# Patient Record
Sex: FEMALE | Race: WHITE | NOT HISPANIC OR LATINO | Employment: OTHER | URBAN - METROPOLITAN AREA
[De-identification: names, ages, dates, MRNs, and addresses within clinical notes are randomized per-mention and may not be internally consistent; named-entity substitution may affect disease eponyms.]

---

## 2024-01-15 ENCOUNTER — TRANSFERRED RECORDS (OUTPATIENT)
Dept: MULTI SPECIALTY CLINIC | Facility: CLINIC | Age: 69
End: 2024-01-15

## 2024-10-01 ENCOUNTER — OFFICE VISIT (OUTPATIENT)
Dept: FAMILY MEDICINE | Facility: CLINIC | Age: 69
End: 2024-10-01
Payer: MEDICARE

## 2024-10-01 VITALS
TEMPERATURE: 99 F | HEART RATE: 67 BPM | WEIGHT: 186.2 LBS | DIASTOLIC BLOOD PRESSURE: 88 MMHG | BODY MASS INDEX: 29.22 KG/M2 | RESPIRATION RATE: 18 BRPM | SYSTOLIC BLOOD PRESSURE: 132 MMHG | OXYGEN SATURATION: 96 % | HEIGHT: 67 IN

## 2024-10-01 DIAGNOSIS — H69.93 DYSFUNCTION OF BOTH EUSTACHIAN TUBES: ICD-10-CM

## 2024-10-01 DIAGNOSIS — Z23 NEED FOR TDAP VACCINATION: ICD-10-CM

## 2024-10-01 DIAGNOSIS — Z11.59 NEED FOR HEPATITIS C SCREENING TEST: ICD-10-CM

## 2024-10-01 DIAGNOSIS — F43.21 SITUATIONAL DEPRESSION: ICD-10-CM

## 2024-10-01 DIAGNOSIS — Z13.29 SCREENING FOR ENDOCRINE, METABOLIC AND IMMUNITY DISORDER: ICD-10-CM

## 2024-10-01 DIAGNOSIS — Z13.228 SCREENING FOR ENDOCRINE, METABOLIC AND IMMUNITY DISORDER: ICD-10-CM

## 2024-10-01 DIAGNOSIS — Z13.1 SCREENING FOR DIABETES MELLITUS: ICD-10-CM

## 2024-10-01 DIAGNOSIS — I10 PRIMARY HYPERTENSION: Primary | ICD-10-CM

## 2024-10-01 DIAGNOSIS — Z13.0 SCREENING FOR ENDOCRINE, METABOLIC AND IMMUNITY DISORDER: ICD-10-CM

## 2024-10-01 PROCEDURE — 99204 OFFICE O/P NEW MOD 45 MIN: CPT | Performed by: FAMILY MEDICINE

## 2024-10-01 RX ORDER — AMLODIPINE BESYLATE 5 MG/1
5 TABLET ORAL DAILY
Qty: 90 TABLET | Refills: 3 | Status: SHIPPED | OUTPATIENT
Start: 2024-10-01

## 2024-10-01 RX ORDER — SENNOSIDES 8.6 MG
2 TABLET ORAL DAILY PRN
COMMUNITY
Start: 2024-01-15

## 2024-10-01 RX ORDER — MULTIPLE VITAMINS W/ MINERALS TAB 9MG-400MCG
TAB ORAL
COMMUNITY
Start: 2020-11-15

## 2024-10-01 RX ORDER — FLUTICASONE PROPIONATE 50 MCG
1 SPRAY, SUSPENSION (ML) NASAL DAILY
Qty: 48 G | Refills: 3 | Status: SHIPPED | OUTPATIENT
Start: 2024-10-01

## 2024-10-01 RX ORDER — CELECOXIB 100 MG/1
100 CAPSULE ORAL 2 TIMES DAILY
COMMUNITY
Start: 2019-12-15

## 2024-10-01 RX ORDER — PROPRANOLOL HYDROCHLORIDE 10 MG/1
20 TABLET ORAL 3 TIMES DAILY
COMMUNITY
Start: 2023-12-15

## 2024-10-01 RX ORDER — SIMVASTATIN 20 MG
20 TABLET ORAL AT BEDTIME
COMMUNITY
Start: 2010-12-15 | End: 2024-10-04

## 2024-10-01 RX ORDER — VENLAFAXINE HYDROCHLORIDE 150 MG/1
150 CAPSULE, EXTENDED RELEASE ORAL DAILY
COMMUNITY
Start: 2022-06-15 | End: 2024-10-01

## 2024-10-01 RX ORDER — ACETAMINOPHEN 160 MG/1
BAR, CHEWABLE ORAL
COMMUNITY
Start: 2020-12-15

## 2024-10-01 RX ORDER — VENLAFAXINE HYDROCHLORIDE 150 MG/1
150 CAPSULE, EXTENDED RELEASE ORAL DAILY
Qty: 90 CAPSULE | Refills: 3 | Status: SHIPPED | OUTPATIENT
Start: 2024-10-01

## 2024-10-01 RX ORDER — LEVOTHYROXINE SODIUM 25 UG/1
25 TABLET ORAL
COMMUNITY
Start: 2014-12-15

## 2024-10-01 RX ORDER — LEVOTHYROXINE SODIUM 150 UG/1
150 TABLET ORAL
COMMUNITY
Start: 2002-12-15

## 2024-10-01 RX ORDER — CETIRIZINE HYDROCHLORIDE 10 MG/1
TABLET ORAL
COMMUNITY
Start: 2013-11-15

## 2024-10-01 RX ORDER — OMEPRAZOLE 20 MG/1
TABLET, DELAYED RELEASE ORAL
COMMUNITY
Start: 2019-12-15

## 2024-10-01 ASSESSMENT — PAIN SCALES - GENERAL: PAINLEVEL: NO PAIN (0)

## 2024-10-01 NOTE — PROGRESS NOTES
"  Assessment & Plan     (I10) Primary hypertension  (primary encounter diagnosis)  Comment: BP well controled today was told to use propranolol TID but feeling like she is having rebound hypertension after effect is gone. Would like something more stale. Advised trial of amlodipine and pt is amenable.   Plan: Lipid panel reflex to direct LDL Non-fasting,         Tdap, tetanus-diptheria-acell pertussis,         (BOOSTRIX) 5-2.5-18.5 LF-MCG/0.5 AZCH         injection, amLODIPine (NORVASC) 5 MG tablet,         Azilsartan Medoxomil 80 MG TABS, Hemoglobin         A1c, CBC with platelets, Comprehensive         metabolic panel (BMP + Alb, Alk Phos, ALT, AST,        Total. Bili, TP), TSH with free T4 reflex    (Z23) Need for Tdap vaccination      (Z11.59) Need for hepatitis C screening test  Plan: Hepatitis C Screen Reflex to HCV RNA Quant and         Genotype    (Z13.29,  Z13.228,  Z13.0) Screening for endocrine, metabolic and immunity disorder  Plan: CBC with platelets, Comprehensive metabolic         panel (BMP + Alb, Alk Phos, ALT, AST, Total.         Bili, TP), TSH with free T4 reflex    (Z13.1) Screening for diabetes mellitus  Plan: Hemoglobin A1c    (F43.21) Situational depression  Comment: No si/hi/avh. Pt stable on effexor. Considering options for down titration but feels stable at this time.   Plan: venlafaxine (EFFEXOR XR) 150 MG 24 hr capsule    (H69.93) Dysfunction of both eustachian tubes  Comment: Known hx of eustachian tube dysfunction. Will trial fluticasone.   Plan: fluticasone (FLONASE) 50 MCG/ACT nasal spray             BMI  Estimated body mass index is 29.16 kg/m  as calculated from the following:    Height as of this encounter: 1.702 m (5' 7\").    Weight as of this encounter: 84.5 kg (186 lb 3.2 oz).   Weight management plan: discussed exercise/diet       Follow up JAYSON Perez is a 68 year old, presenting for the following health issues:  Recheck Medication      10/1/2024    10:50 AM " "  Additional Questions   Roomed by Angelic     Via the Health Maintenance questionnaire, the patient has reported the following services have been completed DEXA: [place], [place] 2022-12-15-Mammogram: [place], [place] 2024-01-15-Colonscopy: [place], [place] 2024-01-01, this information has been sent to the abstraction team.  History of Present Illness       Reason for visit:  New patient. Annual w/primary here Dec 2024. Need refills for 2 meds today. Please check L ear (hearing poor). New orthostatic hypotension. BP not well controlled. Taper off Effexor desired.in Dec 2024   She is taking medications regularly.       Patient is new to [facility]. Transfer From [facility] and patient had most of her health maintenance there. (See above) no acute issues today but needs refills on meds.       Objective    /88   Pulse 67   Temp 99  F (37.2  C) (Temporal)   Resp 18   Ht 1.702 m (5' 7\")   Wt 84.5 kg (186 lb 3.2 oz)   SpO2 96%   BMI 29.16 kg/m    Body mass index is 29.16 kg/m .  Physical Exam  Constitutional:       General: She is not in acute distress.     Appearance: Normal appearance. She is normal weight. She is not ill-appearing, toxic-appearing or diaphoretic.   HENT:      Head: Normocephalic.      Right Ear: Tympanic membrane normal.      Left Ear: Tympanic membrane normal.      Nose: Nose normal.      Mouth/Throat:      Mouth: Mucous membranes are moist.   Eyes:      Pupils: Pupils are equal, round, and reactive to light.   Cardiovascular:      Rate and Rhythm: Normal rate and regular rhythm.      Pulses: Normal pulses.   Pulmonary:      Effort: No respiratory distress.      Breath sounds: Normal breath sounds. No stridor. No wheezing, rhonchi or rales.   Abdominal:      General: Abdomen is flat. There is no distension.      Palpations: Abdomen is soft. There is no mass.      Tenderness: There is no abdominal tenderness. There is no guarding or rebound.      " Hernia: No hernia is present.   Musculoskeletal:         General: Normal range of motion.   Skin:     General: Skin is warm and dry.      Capillary Refill: Capillary refill takes less than 2 seconds.   Neurological:      Mental Status: She is alert. Mental status is at baseline.   Psychiatric:         Mood and Affect: Mood normal.         Behavior: Behavior normal.         Thought Content: Thought content normal.         Judgment: Judgment normal.                Signed Electronically by: Jacqueline Daniels MD

## 2024-10-03 ENCOUNTER — LAB (OUTPATIENT)
Dept: LAB | Facility: CLINIC | Age: 69
End: 2024-10-03
Payer: MEDICARE

## 2024-10-03 ENCOUNTER — MYC MEDICAL ADVICE (OUTPATIENT)
Dept: FAMILY MEDICINE | Facility: CLINIC | Age: 69
End: 2024-10-03

## 2024-10-03 DIAGNOSIS — Z13.29 SCREENING FOR ENDOCRINE, METABOLIC AND IMMUNITY DISORDER: ICD-10-CM

## 2024-10-03 DIAGNOSIS — Z13.0 SCREENING FOR ENDOCRINE, METABOLIC AND IMMUNITY DISORDER: ICD-10-CM

## 2024-10-03 DIAGNOSIS — Z13.1 SCREENING FOR DIABETES MELLITUS: ICD-10-CM

## 2024-10-03 DIAGNOSIS — Z11.59 NEED FOR HEPATITIS C SCREENING TEST: ICD-10-CM

## 2024-10-03 DIAGNOSIS — Z13.228 SCREENING FOR ENDOCRINE, METABOLIC AND IMMUNITY DISORDER: ICD-10-CM

## 2024-10-03 DIAGNOSIS — E78.2 MIXED HYPERLIPIDEMIA: Primary | ICD-10-CM

## 2024-10-03 DIAGNOSIS — I10 PRIMARY HYPERTENSION: ICD-10-CM

## 2024-10-03 LAB
ALBUMIN SERPL BCG-MCNC: 4.2 G/DL (ref 3.5–5.2)
ALP SERPL-CCNC: 103 U/L (ref 40–150)
ALT SERPL W P-5'-P-CCNC: 17 U/L (ref 0–50)
ANION GAP SERPL CALCULATED.3IONS-SCNC: 10 MMOL/L (ref 7–15)
AST SERPL W P-5'-P-CCNC: 21 U/L (ref 0–45)
BILIRUB SERPL-MCNC: 0.5 MG/DL
BUN SERPL-MCNC: 9.2 MG/DL (ref 8–23)
CALCIUM SERPL-MCNC: 9.6 MG/DL (ref 8.8–10.4)
CHLORIDE SERPL-SCNC: 98 MMOL/L (ref 98–107)
CHOLEST SERPL-MCNC: 198 MG/DL
CREAT SERPL-MCNC: 0.72 MG/DL (ref 0.51–0.95)
EGFRCR SERPLBLD CKD-EPI 2021: >90 ML/MIN/1.73M2
ERYTHROCYTE [DISTWIDTH] IN BLOOD BY AUTOMATED COUNT: 11.6 % (ref 10–15)
EST. AVERAGE GLUCOSE BLD GHB EST-MCNC: 108 MG/DL
FASTING STATUS PATIENT QL REPORTED: YES
FASTING STATUS PATIENT QL REPORTED: YES
GLUCOSE SERPL-MCNC: 84 MG/DL (ref 70–99)
HBA1C MFR BLD: 5.4 %
HCO3 SERPL-SCNC: 26 MMOL/L (ref 22–29)
HCT VFR BLD AUTO: 39.1 % (ref 35–47)
HCV AB SERPL QL IA: NONREACTIVE
HDLC SERPL-MCNC: 66 MG/DL
HGB BLD-MCNC: 13.2 G/DL (ref 11.7–15.7)
LDLC SERPL CALC-MCNC: 112 MG/DL
MCH RBC QN AUTO: 30.8 PG (ref 26.5–33)
MCHC RBC AUTO-ENTMCNC: 33.8 G/DL (ref 31.5–36.5)
MCV RBC AUTO: 91 FL (ref 78–100)
NONHDLC SERPL-MCNC: 132 MG/DL
PLATELET # BLD AUTO: 213 10E3/UL (ref 150–450)
POTASSIUM SERPL-SCNC: 4.4 MMOL/L (ref 3.4–5.3)
PROT SERPL-MCNC: 7.5 G/DL (ref 6.4–8.3)
RBC # BLD AUTO: 4.28 10E6/UL (ref 3.8–5.2)
SODIUM SERPL-SCNC: 134 MMOL/L (ref 135–145)
TRIGL SERPL-MCNC: 98 MG/DL
TSH SERPL DL<=0.005 MIU/L-ACNC: 0.33 UIU/ML (ref 0.3–4.2)
WBC # BLD AUTO: 5.6 10E3/UL (ref 4–11)

## 2024-10-03 PROCEDURE — 80061 LIPID PANEL: CPT

## 2024-10-03 PROCEDURE — 80053 COMPREHEN METABOLIC PANEL: CPT

## 2024-10-03 PROCEDURE — 83036 HEMOGLOBIN GLYCOSYLATED A1C: CPT | Mod: GZ

## 2024-10-03 PROCEDURE — 86803 HEPATITIS C AB TEST: CPT

## 2024-10-03 PROCEDURE — 85027 COMPLETE CBC AUTOMATED: CPT

## 2024-10-03 PROCEDURE — 84443 ASSAY THYROID STIM HORMONE: CPT

## 2024-10-03 PROCEDURE — 36415 COLL VENOUS BLD VENIPUNCTURE: CPT

## 2024-10-04 RX ORDER — SIMVASTATIN 40 MG
40 TABLET ORAL AT BEDTIME
Qty: 90 TABLET | Refills: 3 | Status: SHIPPED | OUTPATIENT
Start: 2024-10-04

## 2024-10-06 ENCOUNTER — HEALTH MAINTENANCE LETTER (OUTPATIENT)
Age: 69
End: 2024-10-06

## 2024-11-06 ENCOUNTER — TELEPHONE (OUTPATIENT)
Dept: FAMILY MEDICINE | Facility: CLINIC | Age: 69
End: 2024-11-06
Payer: MEDICARE

## 2024-11-06 DIAGNOSIS — I10 PRIMARY HYPERTENSION: Primary | ICD-10-CM

## 2024-11-06 RX ORDER — LOSARTAN POTASSIUM 100 MG/1
100 TABLET ORAL DAILY
Qty: 90 TABLET | Refills: 3 | Status: SHIPPED | OUTPATIENT
Start: 2024-11-06

## 2024-11-06 NOTE — TELEPHONE ENCOUNTER
Edarbi 80mg now comes up with $242 copay.  Patient is willing to change medication, will you change to Losartan if appropriate?    Thank you,  Sammie Zurita [facility]  [phone]'

## 2025-02-03 ENCOUNTER — TELEPHONE (OUTPATIENT)
Dept: FAMILY MEDICINE | Facility: CLINIC | Age: 70
End: 2025-02-03
Payer: MEDICARE

## 2025-02-03 NOTE — TELEPHONE ENCOUNTER
Patient Quality Outreach    Patient is due for the following:   Physical Preventive Adult Physical    Action(s) Taken:   Schedule a Annual Wellness Visit    Type of outreach:    Sent letter.    Questions for provider review:    None           Tanya Whitfield

## 2025-06-23 ENCOUNTER — HOSPITAL ENCOUNTER (OUTPATIENT)
Dept: GENERAL RADIOLOGY | Facility: CLINIC | Age: 70
Discharge: HOME | End: 2025-06-23
Payer: MEDICARE

## 2025-06-23 ENCOUNTER — OFFICE VISIT (OUTPATIENT)
Dept: FAMILY MEDICINE | Facility: CLINIC | Age: 70
End: 2025-06-23
Payer: MEDICARE

## 2025-06-23 VITALS
RESPIRATION RATE: 10 BRPM | OXYGEN SATURATION: 97 % | DIASTOLIC BLOOD PRESSURE: 85 MMHG | BODY MASS INDEX: 29.26 KG/M2 | HEIGHT: 67 IN | HEART RATE: 69 BPM | TEMPERATURE: 97.3 F | WEIGHT: 186.4 LBS | SYSTOLIC BLOOD PRESSURE: 161 MMHG

## 2025-06-23 DIAGNOSIS — R20.2 NUMBNESS AND TINGLING OF FOOT: ICD-10-CM

## 2025-06-23 DIAGNOSIS — R25.1 TREMOR: ICD-10-CM
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
## 2025-06-23 DIAGNOSIS — R07.81 RIB PAIN ON LEFT SIDE: ICD-10-CM

## 2025-06-23 DIAGNOSIS — R29.898 LEFT LEG WEAKNESS: ICD-10-CM

## 2025-06-23 DIAGNOSIS — R20.0 NUMBNESS AND TINGLING OF FOOT: ICD-10-CM

## 2025-06-23 DIAGNOSIS — R07.81 RIB PAIN ON LEFT SIDE: Primary | ICD-10-CM

## 2025-06-23 PROCEDURE — 3077F SYST BP >= 140 MM HG: CPT

## 2025-06-23 PROCEDURE — 3079F DIAST BP 80-89 MM HG: CPT

## 2025-06-23 PROCEDURE — 1125F AMNT PAIN NOTED PAIN PRSNT: CPT

## 2025-06-23 PROCEDURE — 71101 X-RAY EXAM UNILAT RIBS/CHEST: CPT | Mod: LT

## 2025-06-23 PROCEDURE — 99214 OFFICE O/P EST MOD 30 MIN: CPT

## 2025-06-23 RX ORDER — CYCLOBENZAPRINE HCL 5 MG
5 TABLET ORAL
Qty: 30 TABLET | Refills: 0 | Status: SHIPPED | OUTPATIENT
Start: 2025-06-23

## 2025-06-23 ASSESSMENT — PAIN SCALES - GENERAL: PAINLEVEL_OUTOF10: MILD PAIN (3)

## 2025-06-23 NOTE — PROGRESS NOTES
"  Assessment & Plan     Rib pain on left side  - XR Ribs & Chest Left G/E 3 Views; Future  - cyclobenzaprine (FLEXERIL) 5 MG tablet; Take 1 tablet (5 mg) by mouth nightly as needed for muscle spasms.    Fall, subsequent encounter  - XR Ribs & Chest Left G/E 3 Views; Future  - cyclobenzaprine (FLEXERIL) 5 MG tablet; Take 1 tablet (5 mg) by mouth nightly as needed for muscle spasms.    Left leg weakness  - Adult Neurology  Referral; Future    Numbness and tingling of foot  - Adult Neurology  Referral; Future    Tremor    ASSESSMENT & PLAN    Chest pain and bruising from fall:  Significant bruising observed, possible bruised rib. Patient  reports difficulty transitioning from sitting to lying down and crackling sounds with pain.  X-ray ordered to check for broken ribs. Continue Celebrex 100 mg twice daily and Tylenol as needed. Consider using a lidocaine patch over the ribs for pain relief, available over-the-counter. Diclofenac gel (Voltaren) can be applied up to four times a day.      R Consider Flexeril at bedtime for muscle relaxation and improved sleep.  Risks and side effects: Flexeril may cause drowsiness; avoid driving or making significant decisions while taking it.    Essential tremor:  Essential tremor not well-controlled with current medication.  Referral to neurology for specialized management of essential tremor.    Numbness and tingling in the left leg:  Numbness and tingling in the left leg, possibly related to spinal issues.  Referral to neurology for further evaluation.      BMI  Estimated body mass index is 29.19 kg/m  as calculated from the following:    Height as of this encounter: 1.702 m (5' 7.01\").    Weight as of this encounter: 84.6 kg (186 lb 6.4 oz).             Duong Perez is a 69 year old, presenting for the following health issues:  Fall      6/23/2025    12:36 PM   Additional Questions   Roomed by Abbie         6/23/2025    12:36 PM   Patient Reported " "Additional Medications   Patient reports taking the following new medications none     History of Present Illness       Reason for visit:  Rib muscle pain  Symptom onset:  3-7 days ago  Symptoms include:  L chest muscles pain, weak, pain to flatten body on bed, feel crackle on inhalation when supine  Symptom intensity:  Moderate  Symptom progression:  Staying the same  Had these symptoms before:  No  What makes it worse:  Trying to touch floor L arm, reaching low  What makes it better:  Ice, flat or R side bed rest   She is taking medications regularly.      SUBJECTIVE    Ana, a 69-year-old female, reported falling one week ago while preparing to leave for vacation. She missed the bottom two steps due to her left leg occasionally becoming stiff, which she attributes to arthritis in her back. During the fall, she hit her chest against a piece of furniture, resulting in bruising and chest pain. She has a history of broken ribs, but the current pain is less severe than previous experiences, leading her to decide against visiting the ER. The bruising has spread, and she describes it as having a \"little amoeba\" appearance under the skin.    She experiences difficulty transitioning from a sitting position to lying down, accompanied by crackling sounds and pain. While sitting, she rates the pain as a 2 or 3 out of 10, noting that it is manageable enough for her to drive and sit comfortably. However, she finds it challenging to use her left arm and experiences increased pain when lying flat on the bed.    Ana has a history of knee replacements and has tried various NSAIDs, which caused stomach upset. She currently takes Celebrex, 100 mg twice daily, and supplements with Tylenol as needed. She uses lidocaine patches at night for pain relief, although not every night, and is considering using more than one patch.     Ana recently returned from a vacation in [place], [place], where she visited her daughter and " "son-in-law. She has an essential tremor and has been taking Lopranol, which she finds not very effective.    Ana's blood pressure was high today, which she attributes to stress from driving and meeting a new provider, but she reports that it is generally well-controlled at home. Her  is a retired PA who worked for [employer], and she is a retired pharmacist.  OBJECTIVE    Physical exam:  - Significant bruising observed over the lateral left anterior ribs near the left breast                  Objective    BP (!) 161/85 (BP Location: Right arm, Patient Position: Sitting, Cuff Size: Adult Large)   Pulse 69   Temp 97.3  F (36.3  C) (Temporal)   Resp 10   Ht 1.702 m (5' 7.01\")   Wt 84.6 kg (186 lb 6.4 oz)   SpO2 97%   BMI 29.19 kg/m    Body mass index is 29.19 kg/m .  Physical Exam   GENERAL: alert and no distress  EYES: Eyes grossly normal to inspection, PERRL and conjunctivae and sclerae normal  MS: no gross musculoskeletal defects noted, no edema  SKIN: Significant ecchymosis noted over the lateral left anterior ribs  NEURO: Normal strength and tone, mentation intact and speech normal  PSYCH: mentation appears normal, affect normal/bright    Lab on 10/03/2024   Component Date Value Ref Range Status    Hepatitis C Antibody 10/03/2024 Nonreactive  Nonreactive Final    A nonreactive screening test result does not exclude the possibility of exposure to or infection with HCV. Nonreactive screening test results in individuals with prior exposure to HCV may be due to antibody levels below the limit of detection of this assay or lack of reactivity to the HCV antigens used in this assay. Patients with recent HCV infections (<3 months from time of exposure) may have false-negative HCV antibody results due to the time needed for seroconversion (average of 8 to 9 weeks).    Cholesterol 10/03/2024 198  <200 mg/dL Final    Triglycerides 10/03/2024 98  <150 mg/dL Final    Direct Measure HDL 10/03/2024 66  " >=50 mg/dL Final    LDL Cholesterol Calculated 10/03/2024 112 (H)  <100 mg/dL Final    Non HDL Cholesterol 10/03/2024 132 (H)  <130 mg/dL Final    Patient Fasting > 8hrs? 10/03/2024 Yes   Final    Estimated Average Glucose 10/03/2024 108  <117 mg/dL Final    Hemoglobin A1C 10/03/2024 5.4  <5.7 % Final    Normal <5.7%   Prediabetes 5.7-6.4%    Diabetes 6.5% or higher     Note: Adopted from ADA consensus guidelines.    WBC Count 10/03/2024 5.6  4.0 - 11.0 10e3/uL Final    RBC Count 10/03/2024 4.28  3.80 - 5.20 10e6/uL Final    Hemoglobin 10/03/2024 13.2  11.7 - 15.7 g/dL Final    Hematocrit 10/03/2024 39.1  35.0 - 47.0 % Final    MCV 10/03/2024 91  78 - 100 fL Final    MCH 10/03/2024 30.8  26.5 - 33.0 pg Final    MCHC 10/03/2024 33.8  31.5 - 36.5 g/dL Final    RDW 10/03/2024 11.6  10.0 - 15.0 % Final    Platelet Count 10/03/2024 213  150 - 450 10e3/uL Final    Sodium 10/03/2024 134 (L)  135 - 145 mmol/L Final    Potassium 10/03/2024 4.4  3.4 - 5.3 mmol/L Final    Carbon Dioxide (CO2) 10/03/2024 26  22 - 29 mmol/L Final    Anion Gap 10/03/2024 10  7 - 15 mmol/L Final    Urea Nitrogen 10/03/2024 9.2  8.0 - 23.0 mg/dL Final    Creatinine 10/03/2024 0.72  0.51 - 0.95 mg/dL Final    GFR Estimate 10/03/2024 >90  >60 mL/min/1.73m2 Final    eGFR calculated using 2021 CKD-EPI equation.    Calcium 10/03/2024 9.6  8.8 - 10.4 mg/dL Final    Reference intervals for this test were updated on 7/16/2024 to reflect our healthy population more accurately. There may be differences in the flagging of prior results with similar values performed with this method. Those prior results can be interpreted in the context of the updated reference intervals.    Chloride 10/03/2024 98  98 - 107 mmol/L Final    Glucose 10/03/2024 84  70 - 99 mg/dL Final    Alkaline Phosphatase 10/03/2024 103  40 - 150 U/L Final    AST 10/03/2024 21  0 - 45 U/L Final    ALT 10/03/2024 17  0 - 50 U/L Final    Protein Total 10/03/2024 7.5  6.4 - 8.3 g/dL Final     Albumin 10/03/2024 4.2  3.5 - 5.2 g/dL Final    Bilirubin Total 10/03/2024 0.5  <=1.2 mg/dL Final    Patient Fasting > 8hrs? 10/03/2024 Yes   Final    TSH 10/03/2024 0.33  0.30 - 4.20 uIU/mL Final     Results for orders placed or performed in visit on 10/03/24   Hepatitis C Screen Reflex to HCV RNA Quant and Genotype     Status: Normal   Result Value Ref Range    Hepatitis C Antibody Nonreactive Nonreactive   Lipid panel reflex to direct LDL Non-fasting     Status: Abnormal   Result Value Ref Range    Cholesterol 198 <200 mg/dL    Triglycerides 98 <150 mg/dL    Direct Measure HDL 66 >=50 mg/dL    LDL Cholesterol Calculated 112 (H) <100 mg/dL    Non HDL Cholesterol 132 (H) <130 mg/dL    Patient Fasting > 8hrs? Yes     Narrative    Cholesterol  Desirable: < 200 mg/dL  Borderline High: 200 - 239 mg/dL  High: >= 240 mg/dL    Triglycerides  Normal: < 150 mg/dL  Borderline High: 150 - 199 mg/dL  High: 200-499 mg/dL  Very High: >= 500 mg/dL    Direct Measure HDL  Female: >= 50 mg/dL   Male: >= 40 mg/dL    LDL Cholesterol  Desirable: < 100 mg/dL  Above Desirable: 100 - 129 mg/dL   Borderline High: 130 - 159 mg/dL   High:  160 - 189 mg/dL   Very High: >= 190 mg/dL    Non HDL Cholesterol  Desirable: < 130 mg/dL  Above Desirable: 130 - 159 mg/dL  Borderline High: 160 - 189 mg/dL  High: 190 - 219 mg/dL  Very High: >= 220 mg/dL   Hemoglobin A1c     Status: Normal   Result Value Ref Range    Estimated Average Glucose 108 <117 mg/dL    Hemoglobin A1C 5.4 <5.7 %   CBC with platelets     Status: Normal   Result Value Ref Range    WBC Count 5.6 4.0 - 11.0 10e3/uL    RBC Count 4.28 3.80 - 5.20 10e6/uL    Hemoglobin 13.2 11.7 - 15.7 g/dL    Hematocrit 39.1 35.0 - 47.0 %    MCV 91 78 - 100 fL    MCH 30.8 26.5 - 33.0 pg    MCHC 33.8 31.5 - 36.5 g/dL    RDW 11.6 10.0 - 15.0 %    Platelet Count 213 150 - 450 10e3/uL   Comprehensive metabolic panel (BMP + Alb, Alk Phos, ALT, AST, Total. Bili, TP)     Status: Abnormal   Result Value Ref  Range    Sodium 134 (L) 135 - 145 mmol/L    Potassium 4.4 3.4 - 5.3 mmol/L    Carbon Dioxide (CO2) 26 22 - 29 mmol/L    Anion Gap 10 7 - 15 mmol/L    Urea Nitrogen 9.2 8.0 - 23.0 mg/dL    Creatinine 0.72 0.51 - 0.95 mg/dL    GFR Estimate >90 >60 mL/min/1.73m2    Calcium 9.6 8.8 - 10.4 mg/dL    Chloride 98 98 - 107 mmol/L    Glucose 84 70 - 99 mg/dL    Alkaline Phosphatase 103 40 - 150 U/L    AST 21 0 - 45 U/L    ALT 17 0 - 50 U/L    Protein Total 7.5 6.4 - 8.3 g/dL    Albumin 4.2 3.5 - 5.2 g/dL    Bilirubin Total 0.5 <=1.2 mg/dL    Patient Fasting > 8hrs? Yes    TSH with free T4 reflex     Status: Normal   Result Value Ref Range    TSH 0.33 0.30 - 4.20 uIU/mL     No results found.        Signed Electronically by: Jenny Romano PA-C

## 2025-06-23 NOTE — PATIENT INSTRUCTIONS
ASSESSMENT & PLAN    Chest pain and bruising from fall:  Significant bruising observed, possible bruised rib. Patient  reports difficulty transitioning from sitting to lying down and crackling sounds with pain.  X-ray ordered to check for broken ribs. Continue Celebrex 100 mg twice daily and Tylenol as needed. Consider using a lidocaine patch over the ribs for pain relief, available over-the-counter. Diclofenac gel (Voltaren) can be applied up to four times a day.    Chronic pain due to spinal stenosis:  Chronic pain related to spinal stenosis.  Referral to neurology for further evaluation. Consider Flexeril at bedtime for muscle relaxation and improved sleep.  Risks and side effects: Flexeril may cause drowsiness; avoid driving or making significant decisions while taking it.    Essential tremor:  Essential tremor not well-controlled with current medication.  Referral to neurology for specialized management of essential tremor.    Numbness and tingling in the left leg:  Numbness and tingling in the left leg, possibly related to spinal issues.  Referral to neurology for further evaluation.    Xray ribs    Celebrex 100 mg twice a day     Tylenol as needed    Lidocaine patches as needed    Diclofanec gel up to 4x a day     Flexeril at bedtime    Follow up with neurology

## 2025-06-24 ENCOUNTER — RESULTS FOLLOW-UP (OUTPATIENT)
Dept: FAMILY MEDICINE | Facility: CLINIC | Age: 70
End: 2025-06-24

## 2025-06-24 ENCOUNTER — PATIENT OUTREACH (OUTPATIENT)
Dept: CARE COORDINATION | Facility: CLINIC | Age: 70
End: 2025-06-24
Payer: MEDICARE

## 2025-06-26 ENCOUNTER — PATIENT OUTREACH (OUTPATIENT)
Dept: CARE COORDINATION | Facility: CLINIC | Age: 70
End: 2025-06-26
Payer: MEDICARE